# Patient Record
Sex: MALE | Race: BLACK OR AFRICAN AMERICAN | NOT HISPANIC OR LATINO | Employment: UNEMPLOYED | URBAN - METROPOLITAN AREA
[De-identification: names, ages, dates, MRNs, and addresses within clinical notes are randomized per-mention and may not be internally consistent; named-entity substitution may affect disease eponyms.]

---

## 2017-03-24 ENCOUNTER — HOSPITAL ENCOUNTER (EMERGENCY)
Facility: HOSPITAL | Age: 52
Discharge: HOME | End: 2017-03-24
Attending: SURGERY

## 2017-03-24 VITALS
SYSTOLIC BLOOD PRESSURE: 147 MMHG | HEART RATE: 57 BPM | DIASTOLIC BLOOD PRESSURE: 74 MMHG | OXYGEN SATURATION: 100 % | WEIGHT: 180 LBS | TEMPERATURE: 98 F | BODY MASS INDEX: 24.41 KG/M2 | RESPIRATION RATE: 16 BRPM

## 2017-03-24 DIAGNOSIS — F41.9 ANXIETY: ICD-10-CM

## 2017-03-24 DIAGNOSIS — F10.929 ALCOHOL INTOXICATION, WITH UNSPECIFIED COMPLICATION: Primary | ICD-10-CM

## 2017-03-24 LAB
ALBUMIN SERPL BCP-MCNC: 4 G/DL
ALP SERPL-CCNC: 64 U/L
ALT SERPL W/O P-5'-P-CCNC: 37 U/L
AMYLASE SERPL-CCNC: 54 U/L
ANION GAP SERPL CALC-SCNC: 14 MMOL/L
APTT BLDCRRT: 25.8 SEC
AST SERPL-CCNC: 35 U/L
BASOPHILS # BLD AUTO: 0.03 K/UL
BASOPHILS NFR BLD: 0.3 %
BILIRUB SERPL-MCNC: 0.6 MG/DL
BNP SERPL-MCNC: 16 PG/ML
BUN SERPL-MCNC: 12 MG/DL
CALCIUM SERPL-MCNC: 9.2 MG/DL
CHLORIDE SERPL-SCNC: 108 MMOL/L
CK MB SERPL-MCNC: 5.4 NG/ML
CK MB SERPL-RTO: 1.7 %
CK SERPL-CCNC: 309 U/L
CK SERPL-CCNC: 309 U/L
CO2 SERPL-SCNC: 20 MMOL/L
CREAT SERPL-MCNC: 1.1 MG/DL
D DIMER PPP IA.FEU-MCNC: 0.2 MG/L FEU
DIFFERENTIAL METHOD: NORMAL
EOSINOPHIL # BLD AUTO: 0.2 K/UL
EOSINOPHIL NFR BLD: 1.6 %
ERYTHROCYTE [DISTWIDTH] IN BLOOD BY AUTOMATED COUNT: 13.6 %
EST. GFR  (AFRICAN AMERICAN): >60 ML/MIN/1.73 M^2
EST. GFR  (NON AFRICAN AMERICAN): >60 ML/MIN/1.73 M^2
ETHANOL SERPL-MCNC: 202 MG/DL
GLUCOSE SERPL-MCNC: 93 MG/DL
HCT VFR BLD AUTO: 40.7 %
HGB BLD-MCNC: 14.1 G/DL
INR PPP: 0.9
LYMPHOCYTES # BLD AUTO: 3.7 K/UL
LYMPHOCYTES NFR BLD: 37.9 %
MCH RBC QN AUTO: 30.1 PG
MCHC RBC AUTO-ENTMCNC: 34.6 %
MCV RBC AUTO: 87 FL
MONOCYTES # BLD AUTO: 0.8 K/UL
MONOCYTES NFR BLD: 7.8 %
NEUTROPHILS # BLD AUTO: 5.2 K/UL
NEUTROPHILS NFR BLD: 52.4 %
PLATELET # BLD AUTO: 281 K/UL
PMV BLD AUTO: 9.6 FL
POTASSIUM SERPL-SCNC: 3.6 MMOL/L
PROT SERPL-MCNC: 7.9 G/DL
PROTHROMBIN TIME: 9.5 SEC
RBC # BLD AUTO: 4.68 M/UL
SODIUM SERPL-SCNC: 142 MMOL/L
TROPONIN I SERPL DL<=0.01 NG/ML-MCNC: <0.006 NG/ML
TROPONIN I SERPL DL<=0.01 NG/ML-MCNC: <0.006 NG/ML
WBC # BLD AUTO: 9.87 K/UL

## 2017-03-24 PROCEDURE — 80053 COMPREHEN METABOLIC PANEL: CPT

## 2017-03-24 PROCEDURE — 27000494 *HC OXYGEN SET UP (STAH ONLY)

## 2017-03-24 PROCEDURE — 84484 ASSAY OF TROPONIN QUANT: CPT | Mod: 91

## 2017-03-24 PROCEDURE — 80320 DRUG SCREEN QUANTALCOHOLS: CPT

## 2017-03-24 PROCEDURE — 36415 COLL VENOUS BLD VENIPUNCTURE: CPT

## 2017-03-24 PROCEDURE — 82150 ASSAY OF AMYLASE: CPT

## 2017-03-24 PROCEDURE — 83880 ASSAY OF NATRIURETIC PEPTIDE: CPT

## 2017-03-24 PROCEDURE — 85730 THROMBOPLASTIN TIME PARTIAL: CPT

## 2017-03-24 PROCEDURE — 85379 FIBRIN DEGRADATION QUANT: CPT

## 2017-03-24 PROCEDURE — 99284 EMERGENCY DEPT VISIT MOD MDM: CPT

## 2017-03-24 PROCEDURE — 93010 ELECTROCARDIOGRAM REPORT: CPT | Mod: ,,, | Performed by: INTERNAL MEDICINE

## 2017-03-24 PROCEDURE — 85610 PROTHROMBIN TIME: CPT

## 2017-03-24 PROCEDURE — 25000003 PHARM REV CODE 250: Performed by: SURGERY

## 2017-03-24 PROCEDURE — 85025 COMPLETE CBC W/AUTO DIFF WBC: CPT

## 2017-03-24 PROCEDURE — 93005 ELECTROCARDIOGRAM TRACING: CPT

## 2017-03-24 PROCEDURE — 82553 CREATINE MB FRACTION: CPT

## 2017-03-24 RX ORDER — NAPROXEN SODIUM 220 MG/1
81 TABLET, FILM COATED ORAL
Status: COMPLETED | OUTPATIENT
Start: 2017-03-24 | End: 2017-03-24

## 2017-03-24 RX ADMIN — ASPIRIN 81 MG 81 MG: 81 TABLET ORAL at 08:03

## 2017-03-24 NOTE — ED AVS SNAPSHOT
[facility] [address] [facility] [address]               Royal KESHAWN Del Valle Jr.   3/24/2017  8:39 PM   ED    Description:  Male : 1965   Department:  [facility]           Your Care was Coordinated By:     Provider Role From To    Chuy Cee MD Attending Provider 17 2700 --      Reason for Visit     Altered Mental Status           Diagnoses this Visit        Comments    Alcohol intoxication, with unspecified complication    -  Primary     Anxiety           ED Disposition     ED Disposition Condition Comment    Discharge             To Do List           Follow-up Information     Follow up with Lester Garcia MD. Schedule an appointment as soon as possible for a visit in 2 days.    Specialty:  Family Medicine    Contact information:     [facility] [address] [place] [address]  [phone]        [facility]     [facility] -  Assistance  Registered nurses in the [facility] provide clinical advisement, health education, appointment booking, and other advisory services.  Call for this free service at [phone].             Medications           Message regarding Medications     Verify the changes and/or additions to your medication regime listed below are the same as discussed with your clinician today.  If any of these changes or additions are incorrect, please notify your healthcare provider.        These medications were administered today        Dose Freq    aspirin chewable tablet 81 mg 81 mg ED 1 Time    Sig: Take 1 tablet (81 mg total) by mouth ED 1 Time.    Class: Normal    Route: Oral           Verify that the below list of medications is an accurate representation of the medications you are currently taking.  If none reported, the list may be blank. If incorrect, please contact your healthcare provider. Carry this list with you in case of emergency.           Current Medications      citalopram (CELEXA) 40 MG tablet Take 1 tablet (40 mg total) by mouth once daily.    lisinopril (PRINIVIL,ZESTRIL) 20 MG tablet Take 20 mg by mouth once daily.    meloxicam (MOBIC) 7.5 MG tablet Take 1 tablet (7.5 mg total) by mouth once daily.           Clinical Reference Information           Your Vitals Were     BP Pulse Temp Resp Weight SpO2    142/90 77 96.4 °F (35.8 °C) (Axillary) 24 81.6 kg (180 lb) 100%    BMI                24.41 kg/m2          Allergies as of 3/24/2017     No Known Allergies      Immunizations Administered on Date of Encounter - 3/24/2017     None      ED Micro, Lab, POCT     Start Ordered       Status Ordering Provider    03/24/17 2226 03/24/17 2225  Troponin I  STAT      Final result     03/24/17 2045 03/24/17 2045  Amylase  Once      Final result     03/24/17 2041 03/24/17 2040  Comprehensive metabolic panel  STAT      Final result     03/24/17 2041 03/24/17 2040  CBC auto differential  STAT      Final result     03/24/17 2041 03/24/17 2040  Troponin I  Now then every 6 hours     Start Status   03/24/17 2041 Final result   03/25/17 0241 Scheduled   03/25/17 0841 Scheduled   03/25/17 1441 Scheduled   03/25/17 2041 Scheduled   03/26/17 0241 Scheduled   03/26/17 0841 Scheduled   03/26/17 1441 Scheduled   03/26/17 2041 Scheduled   03/27/17 0241 Scheduled   03/27/17 0841 Scheduled   03/27/17 1441 Scheduled   03/27/17 2041 Scheduled       Acknowledged     03/24/17 2041 03/24/17 2040  CK  STAT      Final result     03/24/17 2041 03/24/17 2040  Brain natriuretic peptide  STAT      Final result     03/24/17 2041 03/24/17 2040  Protime-INR  STAT      Final result     03/24/17 2041 03/24/17 2040  CK-MB  STAT      Final result     03/24/17 2041 03/24/17 2040  Drug screen panel, emergency  STAT      In process     03/24/17 2041 03/24/17 2040  Urinalysis  STAT      In process     03/24/17 2041 03/24/17 2040  APTT  STAT      Final result     03/24/17 2041 03/24/17 2040  D dimer, quantitative  STAT       Final result     03/24/17 2041 03/24/17 2040  Ethanol  STAT      Final result       ED Imaging Orders     Start Ordered       Status Ordering Provider    03/24/17 2045 03/24/17 2045  CT Head Without Contrast  1 time imaging      In process     03/24/17 2041 03/24/17 2040  X-Ray Chest 1 View  1 time imaging      In process       Discharge References/Attachments     ANXIETY DISORDERS, UNDERSTANDING (ENGLISH)      [facility] Sign-Up     Activating your [facility] account is as easy as 1-2-3!     1) Visit [email or web address], select Sign Up Now, enter this activation code and your date of birth, then select Next.  [email or web address]  Expires: 5/8/2017 11:05 PM      2) Create a username and password to use when you visit [facility] in the future and select a security question in case you lose your password and select Next.    3) Enter your e-mail address and click Sign Up!    Additional Information  If you have questions, please e-mail [email or web address] a Guest or call [phone] to talk to our [facility] staff. Remember, [facility] is NOT to be used for urgent needs. For medical emergencies, dial 911.         Smoking Cessation     If you would like to quit smoking:   You may be eligible for free services if you are a [place] resident and started smoking cigarettes before September 1, 1988.  Call the [employer]oking [employer]essation [employer] ([employer]) toll free at (177) 801-1298 or (867) 263-2179.   Call 7-800-QUIT-NOW if you do not meet the above criteria.             [facility] complies with applicable Federal civil rights laws and does not discriminate on the basis of race, color, national origin, age, disability, or sex.        Language Assistance Services     ATTENTION: Language assistance services are available, free of charge. Please call [phone].      ATENCIÓN: Si habla español, tiene a ann disposición servicios gratuitos de asistencia lingüística. Llame al [phone].     CHÚ Ý: N?u b?n nói Ti?ng Vi?t, có các  d?ch v? h? tr? anjum griffin? mi?n phí dành cho b?n. G?i s? [phone].

## 2017-03-25 NOTE — ED TRIAGE NOTES
Patient found on the ground outside by his car. Patient alert but c/o chest pain and he can't see. O2 sats 100% on room air. Patient restless and trying to get out of bed.

## 2017-03-25 NOTE — ED PROVIDER NOTES
[facility]                                        March 24, 2017                   Chief Complaint  51 y.o. male with Altered Mental Status    History of Present Illness  Royal KESHAWN Del Valle Jr. presents to the emergency room with palpitations and anxiety  The patient presents to the ER crying, he smells strongly of alcohol on ER arrival tonight  Patient states he's had palpitations and chest pains after a day of drinking alcohol PTA  Patient is normal sinus rhythm on EKG without ST changes or concerning findings now  Patient has no history of coronary artery disease, states the pain started with his drinking  The patient has a soft abdomen with no signs of pain on palpation in the ER this evening  Pt has no nausea or vomiting, states he has no history of pancreatitis or EtOH related pain  Patient is crying and having severe anxiety, pain subsided when family arrived this evening  Patient admits to having a drinking problem, got in argument prior to ER arrival tonight    The history is provided by the patient  Medical history: Arthritis and hypertension  Surgical history: Knee surgery  No ALLERGIES    Review of Systems and Physical Exam     Review of Systems  -- Constitution - no fever, denies fatigue, no weakness, no chills  -- Eyes - no tearing or redness, no visual disturbance  -- Ear, Nose - no tinnitus or earache, no nasal congestion or discharge  -- Mouth,Throat - no sore throat, no toothache, normal voice, normal swallowing  -- Respiratory - denies cough and congestion, no shortness of breath, no MCCLAIN  -- Cardiovascular - chest pain, no palpitations, denies claudication  -- Gastrointestinal - denies abdominal pain, nausea, vomiting, or diarrhea  -- Musculoskeletal - denies back pain, negative for myalgias and arthralgias   -- Neurological - no headache, denies weakness or seizure; no LOC  -- Psychiatric - anxiety, denies SI or HI, no psychosis or fractured thought noted     Vital Signs  -- His  blood pressure is 142/90 (abnormal) and his pulse is 77.   -- His respiration is 24 (abnormal) and oxygen saturation is 100%.      Physical Exam  -- Nursing note and vitals reviewed  -- Constitutional: Appears well-developed and well-nourished  -- Head: Atraumatic. Normocephalic. No obvious abnormality  -- Eyes: Pupils are equal and reactive to light. Normal conjunctiva and lids  -- Cardiac: Normal rate, regular rhythm and normal heart sounds  -- Pulmonary: Normal respiratory effort, breath sounds clear to auscultation  -- Abdominal: Soft, no tenderness. Normal bowel sounds. Normal liver edge  -- Musculoskeletal: Normal range of motion, no effusions. Joints stable   -- Neurological: No focal deficits. Showed good interaction with staff  -- Vascular: Posterior tibial, dorsalis pedis and radial pulses 2+ bilaterally      Emergency Room Course     Treatment and Evaluation  -- The EKG findings today were without concerning findings from baseline   -- The CT of the head performed in the ER today was negative for acute pathology   -- Chest x-ray showed no infiltrate and showed no acute pathology   -- The electrolytes drawn in the ER today were within normal limits   -- The CBC drawn in the ER today was within normal limits   -- Amylase drawn in the ER today was within normal limits   -- The BNP drawn in the ER today were within normal limits   -- The d-dimer drawn in the ER today were within normal limits.   -- The 1st troponin drawn in the ER today was within normal limits    -- The 2nd troponin drawn in the ER today was within normal limits   -- Blood alcohol level was over 200   -- PO 81 MG ASA given in today in the ER    Diagnosis  -- The primary encounter diagnosis was Alcohol intoxication, with unspecified complication.   -- A diagnosis of Anxiety was also pertinent to this visit.    Disposition and Plan  -- Disposition: home  -- Condition: stable  -- Follow-up: Patient to follow up with Lester Garcia MD in 1-2  days.  -- I advised the patient that we have found no life threatening condition today  -- At this time, I believe the patient is clinically stable for discharge.   -- The patient acknowledges that close follow up with a MD is required   -- Patient agrees to comply with all instruction and direction given in the ER    This note is dictated on Dragon Natural Speaking word recognition program.  There are word recognition mistakes that are occasionally missed on review.           Chuy Cee MD  03/24/17 5886